# Patient Record
Sex: MALE | Race: WHITE | NOT HISPANIC OR LATINO | Employment: FULL TIME | URBAN - METROPOLITAN AREA
[De-identification: names, ages, dates, MRNs, and addresses within clinical notes are randomized per-mention and may not be internally consistent; named-entity substitution may affect disease eponyms.]

---

## 2020-01-16 ENCOUNTER — HOSPITAL ENCOUNTER (EMERGENCY)
Facility: HOSPITAL | Age: 40
Discharge: HOME | End: 2020-01-17
Attending: EMERGENCY MEDICINE
Payer: COMMERCIAL

## 2020-01-16 DIAGNOSIS — S62.661B OPEN NONDISPLACED FRACTURE OF DISTAL PHALANX OF LEFT INDEX FINGER, INITIAL ENCOUNTER: Primary | ICD-10-CM

## 2020-01-16 DIAGNOSIS — S61.209A FINGERTIP AVULSION, INITIAL ENCOUNTER: ICD-10-CM

## 2020-01-16 PROCEDURE — 99284 EMERGENCY DEPT VISIT MOD MDM: CPT | Mod: 25

## 2020-01-16 PROCEDURE — 12041 INTMD RPR N-HF/GENIT 2.5CM/<: CPT | Mod: F1

## 2020-01-16 PROCEDURE — 25000003 PHARM REV CODE 250: Performed by: EMERGENCY MEDICINE

## 2020-01-16 PROCEDURE — 96372 THER/PROPH/DIAG INJ SC/IM: CPT

## 2020-01-16 PROCEDURE — 29130 APPL FINGER SPLINT STATIC: CPT | Mod: F1

## 2020-01-16 RX ORDER — LIDOCAINE HYDROCHLORIDE 10 MG/ML
10 INJECTION, SOLUTION EPIDURAL; INFILTRATION; INTRACAUDAL; PERINEURAL
Status: COMPLETED | OUTPATIENT
Start: 2020-01-16 | End: 2020-01-16

## 2020-01-16 RX ORDER — OXYCODONE AND ACETAMINOPHEN 5; 325 MG/1; MG/1
2 TABLET ORAL
Status: COMPLETED | OUTPATIENT
Start: 2020-01-16 | End: 2020-01-16

## 2020-01-16 RX ADMIN — LIDOCAINE HYDROCHLORIDE 100 MG: 10 INJECTION, SOLUTION EPIDURAL; INFILTRATION; INTRACAUDAL at 11:01

## 2020-01-16 RX ADMIN — OXYCODONE HYDROCHLORIDE AND ACETAMINOPHEN 2 TABLET: 5; 325 TABLET ORAL at 08:01

## 2020-01-17 VITALS
DIASTOLIC BLOOD PRESSURE: 100 MMHG | RESPIRATION RATE: 18 BRPM | HEART RATE: 80 BPM | TEMPERATURE: 98 F | BODY MASS INDEX: 30.69 KG/M2 | SYSTOLIC BLOOD PRESSURE: 142 MMHG | OXYGEN SATURATION: 100 % | WEIGHT: 184.19 LBS | HEIGHT: 65 IN

## 2020-01-17 PROCEDURE — 25000003 PHARM REV CODE 250: Performed by: EMERGENCY MEDICINE

## 2020-01-17 PROCEDURE — 63600175 PHARM REV CODE 636 W HCPCS: Performed by: EMERGENCY MEDICINE

## 2020-01-17 PROCEDURE — 90715 TDAP VACCINE 7 YRS/> IM: CPT | Performed by: EMERGENCY MEDICINE

## 2020-01-17 PROCEDURE — 90471 IMMUNIZATION ADMIN: CPT | Performed by: EMERGENCY MEDICINE

## 2020-01-17 RX ORDER — MORPHINE SULFATE 4 MG/ML
4 INJECTION, SOLUTION INTRAMUSCULAR; INTRAVENOUS
Status: COMPLETED | OUTPATIENT
Start: 2020-01-17 | End: 2020-01-17

## 2020-01-17 RX ORDER — ONDANSETRON 4 MG/1
4 TABLET, ORALLY DISINTEGRATING ORAL
Status: COMPLETED | OUTPATIENT
Start: 2020-01-17 | End: 2020-01-17

## 2020-01-17 RX ORDER — TRAMADOL HYDROCHLORIDE 50 MG/1
50 TABLET ORAL EVERY 4 HOURS PRN
Qty: 21 TABLET | Refills: 0 | Status: SHIPPED | OUTPATIENT
Start: 2020-01-17

## 2020-01-17 RX ORDER — CEPHALEXIN 500 MG/1
500 CAPSULE ORAL 4 TIMES DAILY
Qty: 28 CAPSULE | Refills: 0 | Status: SHIPPED | OUTPATIENT
Start: 2020-01-17 | End: 2020-01-24

## 2020-01-17 RX ORDER — CEFAZOLIN SODIUM 1 G/3ML
1 INJECTION, POWDER, FOR SOLUTION INTRAMUSCULAR; INTRAVENOUS
Status: COMPLETED | OUTPATIENT
Start: 2020-01-17 | End: 2020-01-17

## 2020-01-17 RX ADMIN — CEFAZOLIN 1 G: 330 INJECTION, POWDER, FOR SOLUTION INTRAMUSCULAR; INTRAVENOUS at 12:01

## 2020-01-17 RX ADMIN — ONDANSETRON 4 MG: 4 TABLET, ORALLY DISINTEGRATING ORAL at 12:01

## 2020-01-17 RX ADMIN — MORPHINE SULFATE 4 MG: 4 INJECTION INTRAVENOUS at 12:01

## 2020-01-17 RX ADMIN — CLOSTRIDIUM TETANI TOXOID ANTIGEN (FORMALDEHYDE INACTIVATED), CORYNEBACTERIUM DIPHTHERIAE TOXOID ANTIGEN (FORMALDEHYDE INACTIVATED), BORDETELLA PERTUSSIS TOXOID ANTIGEN (GLUTARALDEHYDE INACTIVATED), BORDETELLA PERTUSSIS FILAMENTOUS HEMAGGLUTININ ANTIGEN (FORMALDEHYDE INACTIVATED), BORDETELLA PERTUSSIS PERTACTIN ANTIGEN, AND BORDETELLA PERTUSSIS FIMBRIAE 2/3 ANTIGEN 0.5 ML: 5; 2; 2.5; 5; 3; 5 INJECTION, SUSPENSION INTRAMUSCULAR at 01:01

## 2020-01-17 NOTE — ED PROVIDER NOTES
SCRIBE #1 NOTE: I, Briana Ayala, am scribing for, and in the presence of, Carmine Painting MD. I have scribed the entire note.       History     Chief Complaint   Patient presents with    Laceration     pt reports he cut the tip of his second finger on his laeft hand off with a steel saw an hour ago. Bleeding controlled.  tetanus not up to date      Review of patient's allergies indicates:  No Known Allergies      History of Present Illness     HPI    1/16/2020, 8:29 PM  History obtained from the patient      History of Present Illness: Zane Xie is a 39 y.o. male patient who presents to the Emergency Department for evaluation of L index finger injury which onset suddenly PTA while using a skill saw. Symptoms are constant and moderate in severity. No mitigating or exacerbating factors reported. No associated sxs. Patient denies any LOC, fever, chills, extremity weakness/numbness, dizziness, lightheadedness, n/v, and all other sxs at this time. No prior tx. Tetanus is not UTD. No further complaints or concerns at this time.         Arrival mode: Personal vehicle    PCP: Primary Doctor No        Past Medical History:  History reviewed. No pertinent medical history.    Past Surgical History:  History reviewed. No pertinent surgical history.    Family History:  History reviewed. No pertinent family history.    Social History:  Social History     Tobacco Use    Smoking status: Current Every Day Smoker     Packs/day: 1.00   Substance and Sexual Activity    Alcohol use: Yes     Comment: occas    Drug use: unknown    Sexual activity: unknown        Review of Systems     Review of Systems   Constitutional: Negative for activity change, chills, diaphoresis and fever.   HENT: Negative for congestion, rhinorrhea, sneezing, sore throat and trouble swallowing.    Eyes: Negative for pain.   Respiratory: Negative for cough, chest tightness, shortness of breath, wheezing and stridor.    Cardiovascular:  Negative for chest pain, palpitations and leg swelling.   Gastrointestinal: Negative for abdominal distention, abdominal pain, constipation, diarrhea, nausea and vomiting.   Genitourinary: Negative for difficulty urinating, dysuria, frequency and urgency.   Musculoskeletal: Negative for arthralgias, back pain, myalgias, neck pain and neck stiffness.        (+) L index finger injury   Skin: Negative for pallor, rash and wound.   Neurological: Negative for dizziness, syncope, weakness, light-headedness, numbness and headaches.   Hematological: Does not bruise/bleed easily.   All other systems reviewed and are negative.     Physical Exam     Initial Vitals [01/16/20 2007]   BP Pulse Resp Temp SpO2   (!) 165/111 84 16 98.5 °F (36.9 °C) 100 %      MAP       --          Physical Exam  Nursing Notes and Vital Signs Reviewed.  Constitutional: Patient is in mild distress. Well-developed and well-nourished.  Head: Atraumatic. Normocephalic.  Eyes: PERRL. EOM intact. Conjunctivae are not pale. No scleral icterus.  ENT: Mucous membranes are moist. Oropharynx is clear and symmetric.    Neck: Supple. Full ROM. No lymphadenopathy.  Cardiovascular: Regular rate. Regular rhythm. No murmurs, rubs, or gallops. Distal pulses are 2+ and symmetric.  Pulmonary/Chest: No respiratory distress. Clear to auscultation bilaterally. No wheezing or rales.  Abdominal: Soft and non-distended.  There is no tenderness.  No rebound, guarding, or rigidity. Good bowel sounds.  Genitourinary: No CVA tenderness  Musculoskeletal: L hand index finger distal tip amputation including distal third of fingernail including nail matrix with venous oozing.  Skin: Warm and dry.  Neurological:  Alert, awake, and appropriate.  Normal speech.  No acute focal neurological deficits are appreciated.  Psychiatric: Normal affect. Good eye contact. Appropriate in content.     ED Course   Closed treatment of distal phalangeal fracture of left index finger, without  manipulation  Date/Time: 1/16/2020 11:15 PM  Performed by: Carmine Painting MD  Authorized by: Carmine Painting MD     Consent Done?:  Yes  Universal Protocol:     Verbal consent obtained?: Yes      Consent given by:  Patient    Patient identity confirmed:  Name, verbally with patient and provided demographic data  Injury:     Injury location:  Finger    Location details:  Left index finger    Injury type:  Fracture    Fracture type: distal phalanx      MCP joint involved?: No      Any IP joint involved?: No        Pre-procedure assessment:     Neurovascular status: Neurovascularly intact      Distal perfusion: normal      Neurological function: normal      Range of motion: normal      Local anesthesia used?: Yes      Anesthesia:  Digital block    Local anesthetic:  Lidocaine 1% without epinephrine    Patient sedated?: No        Selections made in this section will also lock the Injury type section above.:     Manipulation performed?: No      Immobilization:  Splint    Splint type:  Static finger    Supplies used:  Aluminum splint    Complications: No      Specimens: No      Implants: No    Post-procedure assessment:     Neurovascular status: Neurovascularly intact      Distal perfusion: normal      Neurological function: normal      Range of motion: splinted      Patient tolerance:  Patient tolerated the procedure well with no immediate complications                 Intermediate laceration repair, hand (left index finger), 1.7 cm, multi-layer  Date/Time: 1/16/2020 11:16 PM  Performed by: Carmine Painting MD  Authorized by: Carmine Painting MD   Consent Done: Yes  Consent: Verbal consent obtained.  Risks and benefits: risks, benefits and alternatives were discussed  Consent given by: patient  Patient identity confirmed: name, verbally with patient and provided demographic data  Body area: upper extremity  Location details: left index finger  Laceration length: 1.7 cm  Foreign bodies: no foreign  "bodies  Tendon involvement: none  Nerve involvement: none  Vascular damage: no  Anesthesia: digital block    Anesthesia:  Local anesthesia used: yes  Local Anesthetic: lidocaine 1% without epinephrine  Patient sedated: no  Preparation: Patient was prepped and draped in the usual sterile fashion.  Irrigation solution: saline  Amount of cleaning: extensive  Debridement: extensive  Degree of undermining: minimal  Skin closure: 3-0 Prolene (4)  Subcutaneous closure: 3-0 Vicryl (2)  Number of sutures: 7  Technique: simple (simple interrupted)  Approximation: loose  Approximation difficulty: complex  Dressing: Xeroform gauze, 4x4 sterile gauze, dressing applied and splint for protection  Patient tolerance: Patient tolerated the procedure well with no immediate complications        ED Vital Signs:  Vitals:    01/16/20 2007 01/16/20 2247 01/17/20 0122   BP: (!) 165/111  (!) 142/100   Pulse: 84 80 80   Resp: 16  18   Temp: 98.5 °F (36.9 °C)  98.2 °F (36.8 °C)   TempSrc: Oral  Oral   SpO2: 100%  100%   Weight: 83.6 kg (184 lb 3.1 oz)     Height: 5' 5" (1.651 m)         Abnormal Lab Results:  Labs Reviewed - No data to display     All Lab Results:  none    Imaging Results:  Imaging Results          X-Ray Finger 2 or More Views Left (Final result)  Result time 01/16/20 20:47:12    Final result by KOFI Gonsalez Sr., MD (01/16/20 20:47:12)                 Impression:      1. There has been amputation of the distal aspect of the distal phalanx of the index finger.  2. There is an acute appearing nondisplaced oblique fracture in the lateral aspect of the distal portion of the distal phalanx of the index finger.  3. There has been amputation of the soft tissue from the distal aspect of the index finger.      Electronically signed by: Xander Gonsalez MD  Date:    01/16/2020  Time:    20:47             Narrative:    EXAMINATION:  XR FINGER 2 OR MORE VIEWS LEFT    CLINICAL HISTORY:  finger " injury;    COMPARISON:  12/01/2012    FINDINGS:  There has been amputation of the distal aspect of the distal phalanx of the index finger.  There is an acute appearing nondisplaced oblique fracture in the lateral aspect of the distal portion of the distal phalanx of the index finger. There has been amputation of the soft tissue from the distal aspect of the index finger.  There is no dislocation.                                      The Emergency Provider reviewed the vital signs and test results, which are outlined above.     ED Discussion     8:14 PM: Discussed patient's case with Dr. Thompson, orthopedic surgeon on call, for recommendations. He recommends repairing the distal phalanx fracture-amputation in the ED as above, Rongeur bone if necessary for loose closure, and discharge to follow up with hand surgery in outpatient clinic for further management.      12:05 AM: Discussed with pt all pertinent ED information and results. Discussed pt dx and plan of tx. Gave pt all f/u and return to the ED instructions. All questions and concerns were addressed at this time. Pt expresses understanding of information and instructions, and is comfortable with plan to discharge. Pt is stable for discharge.    I discussed with patient and/or family/caretaker that evaluation in the ED does not suggest any emergent or life threatening medical conditions requiring immediate intervention beyond what was provided in the ED, and I believe patient is safe for discharge.  Regardless, an unremarkable evaluation in the ED does not preclude the development or presence of a serious of life threatening condition. As such, patient was instructed to return immediately for any worsening or change in current symptoms.           Medical Decision Making:   Clinical Tests:   Radiological Study: Ordered and Reviewed           ED Medication(s):  Medications   oxyCODONE-acetaminophen 5-325 mg per tablet 2 tablet (2 tablets Oral Given 1/16/20 2034)    lidocaine (PF) 10 mg/ml (1%) injection 100 mg (100 mg Infiltration Given 1/16/20 2869)   morphine injection 4 mg (4 mg Intramuscular Given 1/17/20 0052)   ondansetron disintegrating tablet 4 mg (4 mg Oral Given 1/17/20 0052)   ceFAZolin injection 1 g (1 g Intramuscular Given 1/17/20 0052)   DIPH,PERTUSS(ACEL),TET VAC(PF)(ADULT)(ADACEL)(TDaP) (0.5 mLs Intramuscular Given 1/17/20 0113)       There are no discharge medications for this patient.      Follow-up Information     Abhay Smith MD. Schedule an appointment as soon as possible for a visit in 3 days.    Specialty:  Orthopedic Surgery  Why:  sutures must come out within 7-10 days  Contact information:  [address]  [place] [address]  [phone]                       Scribe Attestation:   Scribe #1: I performed the above scribed service and the documentation accurately describes the services I performed. I attest to the accuracy of the note.     Attending:   Physician Attestation Statement for Scribe #1: I, Carmine Painting MD, personally performed the services described in this documentation, as scribed by Briana Ayala, in my presence, and it is both accurate and complete.            Clinical Impression       ICD-10-CM ICD-9-CM   1. Open nondisplaced fracture of distal phalanx of left index finger, initial encounter S62.661B 816.12   2. Fingertip avulsion, initial encounter S61.209A 883.0       Disposition:   Disposition: Discharged  Condition: Stable         Carmine Painting MD  01/18/20 2120

## 2020-01-23 ENCOUNTER — TELEPHONE (OUTPATIENT)
Dept: ORTHOPEDICS | Facility: CLINIC | Age: 40
End: 2020-01-23

## 2020-01-24 ENCOUNTER — OFFICE VISIT (OUTPATIENT)
Dept: ORTHOPEDICS | Facility: CLINIC | Age: 40
End: 2020-01-24
Payer: COMMERCIAL

## 2020-01-24 VITALS
HEART RATE: 96 BPM | DIASTOLIC BLOOD PRESSURE: 101 MMHG | SYSTOLIC BLOOD PRESSURE: 140 MMHG | WEIGHT: 184 LBS | HEIGHT: 65 IN | BODY MASS INDEX: 30.66 KG/M2

## 2020-01-24 DIAGNOSIS — S68.111A TRAUMATIC AMPUTATION OF LEFT INDEX FINGER: ICD-10-CM

## 2020-01-24 DIAGNOSIS — S62.661A CLOSED NONDISPLACED FRACTURE OF DISTAL PHALANX OF LEFT INDEX FINGER, INITIAL ENCOUNTER: Primary | ICD-10-CM

## 2020-01-24 PROCEDURE — 99999 PR PBB SHADOW E&M-EST. PATIENT-LVL III: CPT | Mod: PBBFAC,,,

## 2020-01-24 PROCEDURE — 99999 PR PBB SHADOW E&M-EST. PATIENT-LVL III: ICD-10-PCS | Mod: PBBFAC,,,

## 2020-01-24 RX ORDER — DEXTROAMPHETAMINE SACCHARATE, AMPHETAMINE ASPARTATE MONOHYDRATE, DEXTROAMPHETAMINE SULFATE AND AMPHETAMINE SULFATE 7.5; 7.5; 7.5; 7.5 MG/1; MG/1; MG/1; MG/1
CAPSULE, EXTENDED RELEASE ORAL
COMMUNITY
Start: 2020-01-05

## 2020-01-24 RX ORDER — DEXTROAMPHETAMINE SACCHARATE, AMPHETAMINE ASPARTATE, DEXTROAMPHETAMINE SULFATE AND AMPHETAMINE SULFATE 2.5; 2.5; 2.5; 2.5 MG/1; MG/1; MG/1; MG/1
TABLET ORAL
COMMUNITY
Start: 2020-01-05

## 2020-01-24 NOTE — PROGRESS NOTES
Subjective:     Patient ID: Zane Xie is a 39 y.o. male.    Chief Complaint: Pain and Injury of the Left Hand      HPI:   39-year-old male presents as in ED follow-up for a partial distal phalanx amputation of his left 2nd digit 1 week ago.  In the ED he was also diagnosed with a nondisplaced fracture shows distal phalanx.  Amputation was sutured splinted.  He was told to follow up us in 1 week.  He was given 2 weeks of antibiotics.      History reviewed. No pertinent past medical history.  History reviewed. No pertinent surgical history.  Review of patient's allergies indicates:  No Known Allergies   Review of Systems   Constitutional: Negative.    Eyes: Negative.    Respiratory: Negative.    Cardiovascular: Negative.    Musculoskeletal: Positive for joint pain.   Skin: Negative.    Neurological: Negative.    Psychiatric/Behavioral: Negative.           Objective:     Vitals:    01/24/20 1531   BP: (!) 140/101   Pulse: 96      General    Constitutional: He is oriented to person, place, and time. He appears well-developed and well-nourished.   HENT:   Head: Normocephalic and atraumatic.   Eyes: Conjunctivae are normal.   Neck: Normal range of motion.   Cardiovascular: Normal rate.    Pulmonary/Chest: Effort normal.   Abdominal: He exhibits no distension.   Neurological: He is alert and oriented to person, place, and time.   Psychiatric: He has a normal mood and affect. His behavior is normal. Judgment and thought content normal.         Left Hand/Wrist Exam     Inspection   Deformity: Hand -  present    Range of Motion     Finger Flexion   DIP Index: normal     Finger Extension   DIP Index: normal    Comments:  Distal phalanx index finger seen with sutures in place overlying scab.  Tip appears well perfused.          Muscle Strength   Left Upper Extremity  Index Finger: 5/5       Imaging:  X-ray of the left index finger shows a nondisplaced fracture of the tip of the distal phalanx of the 2nd  digit.  -please see radiologist dictation for complete report    Assessment / Plan:     Encounter Diagnoses   Name Primary?    Closed nondisplaced fracture of distal phalanx of left index finger, initial encounter Yes    Traumatic amputation of left index finger      Patient should continue his antibiotics from the emergency department.  He will follow up in 1 week to get his sutures taken out. He should keep his amputated tip covered with a Band-Aid.  He should continue range of motion exercises and desensitization      Follow-up: 1 week no xrays      -Discussed findings with patient  -Treatment options and alternatives were discussed with the patient. Patient expressed understanding. Patient was given the opportunity to ask questions and be an active participant in their medical care. Patient had no further questions or concerns at this time.

## 2020-01-31 ENCOUNTER — OFFICE VISIT (OUTPATIENT)
Dept: ORTHOPEDICS | Facility: CLINIC | Age: 40
End: 2020-01-31
Payer: COMMERCIAL

## 2020-01-31 VITALS
DIASTOLIC BLOOD PRESSURE: 99 MMHG | BODY MASS INDEX: 29.57 KG/M2 | HEIGHT: 66 IN | HEART RATE: 116 BPM | WEIGHT: 184 LBS | SYSTOLIC BLOOD PRESSURE: 144 MMHG

## 2020-01-31 DIAGNOSIS — S62.661D CLOSED NONDISPLACED FRACTURE OF DISTAL PHALANX OF LEFT INDEX FINGER WITH ROUTINE HEALING, SUBSEQUENT ENCOUNTER: Primary | ICD-10-CM

## 2020-01-31 DIAGNOSIS — S68.111A TRAUMATIC AMPUTATION OF LEFT INDEX FINGER: ICD-10-CM

## 2020-01-31 PROCEDURE — 99999 PR PBB SHADOW E&M-EST. PATIENT-LVL III: CPT | Mod: PBBFAC,,,

## 2020-01-31 PROCEDURE — 99999 PR PBB SHADOW E&M-EST. PATIENT-LVL III: ICD-10-PCS | Mod: PBBFAC,,,

## 2020-01-31 NOTE — PROGRESS NOTES
Subjective:     Patient ID: Zane Xie is a 39 y.o. male.    Chief Complaint: Post-op Evaluation of the Left Hand      HPI:   39-year-old male following up for a distal phalanx index finger amputation with nondisplaced fracture.  He has last seen 1 week ago for wound evaluation. He is here today for repeat wound evaluation and suture removal.  He reports continued to attend pain neck pain.  He has been doing his twice daily peroxide and water soaks as instructed by Dr. Merida.      History reviewed. No pertinent past medical history.  History reviewed. No pertinent surgical history.  Review of patient's allergies indicates:  No Known Allergies   Review of Systems   Constitutional: Negative.    Eyes: Negative.    Respiratory: Negative.    Cardiovascular: Negative.    Musculoskeletal: Positive for myalgias.   Skin: Negative.    Neurological: Negative.    Psychiatric/Behavioral: Negative.           Objective:     Vitals:    01/31/20 1512   BP: (!) 144/99   Pulse: (!) 116      General    Constitutional: He is oriented to person, place, and time. He appears well-developed and well-nourished.   HENT:   Head: Normocephalic and atraumatic.   Eyes: Conjunctivae are normal.   Neck: Normal range of motion.   Cardiovascular: Normal rate.    Pulmonary/Chest: Effort normal.   Abdominal: He exhibits no distension.   Neurological: He is alert and oriented to person, place, and time.   Psychiatric: He has a normal mood and affect. His behavior is normal. Judgment and thought content normal.         Left Hand/Wrist Exam     Inspection   Bruising: Hand -  present  Deformity: Hand -  present    Other     Sensory Exam  Median Distribution: normal  Ulnar Distribution: normal  Radial Distribution: normal    Comments:  Tenderness at the site of amputation.  No gross infection seen.            Assessment / Plan:     No diagnosis found.    Sutures to be removed today.  Amputation is without signs of infection.  Patient has  finished his oral antibiotics.  He was instructed to continue his desensitization exercises.  He may put triple antibiotic cream on once daily.  He will continue foot on range of motion exercises well. He was instructed on the signs to look for infection including erythema fevers increased pain drainage. If these are present he should return to clinic for Evaluation.    Follow-up:  In 2 weeks for repeat wound evaluation      -Discussed findings with patient  -Treatment options and alternatives were discussed with the patient. Patient expressed understanding. Patient was given the opportunity to ask questions and be an active participant in their medical care. Patient had no further questions or concerns at this time.

## 2020-02-14 ENCOUNTER — OFFICE VISIT (OUTPATIENT)
Dept: ORTHOPEDICS | Facility: CLINIC | Age: 40
End: 2020-02-14
Payer: COMMERCIAL

## 2020-02-14 VITALS
HEART RATE: 91 BPM | WEIGHT: 184 LBS | BODY MASS INDEX: 29.57 KG/M2 | SYSTOLIC BLOOD PRESSURE: 147 MMHG | DIASTOLIC BLOOD PRESSURE: 111 MMHG | HEIGHT: 66 IN

## 2020-02-14 DIAGNOSIS — S68.111A TRAUMATIC AMPUTATION OF LEFT INDEX FINGER: Primary | ICD-10-CM

## 2020-02-14 PROCEDURE — 99999 PR PBB SHADOW E&M-EST. PATIENT-LVL III: ICD-10-PCS | Mod: PBBFAC,,,

## 2020-02-14 PROCEDURE — 99999 PR PBB SHADOW E&M-EST. PATIENT-LVL III: CPT | Mod: PBBFAC,,,

## 2020-02-14 NOTE — PROGRESS NOTES
Subjective:      Patient ID: Zaen Xie is a 39 y.o. male.    Chief Complaint: Pain of the Left Hand      Zane Xie returns to the clinic today for his 2nd post-operative examination of traumatic amputation of left index distal phalanx. Zane Xie rates pain at a 1/10 on visual analog scale.        Objective:    Ortho/SPM Exam    Left index fingers:  Interval healing with new epidermis.  Superficial scab.  No overt sign of infection.  No exposed bone.  Hypersensitivity.  Brisk cap refill.        Assessment:  Improving       No diagnosis found.      Plan:  Patient will continue daily soap and water washes.  He can use a dry dressing as needed.  He will follow up in 4 weeks.  No restriction from usual work, recreational, or sports activities.       There are no diagnoses linked to this encounter.      Follow-up: Zane Xie to follow up in 4 weeks. If there are any questions prior to this, the patient was instructed to contact the office.

## 2020-03-13 ENCOUNTER — OFFICE VISIT (OUTPATIENT)
Dept: ORTHOPEDICS | Facility: CLINIC | Age: 40
End: 2020-03-13
Payer: COMMERCIAL

## 2020-03-13 VITALS
DIASTOLIC BLOOD PRESSURE: 118 MMHG | HEIGHT: 66 IN | BODY MASS INDEX: 29.57 KG/M2 | HEART RATE: 122 BPM | SYSTOLIC BLOOD PRESSURE: 155 MMHG | WEIGHT: 184 LBS

## 2020-03-13 DIAGNOSIS — S68.111A TRAUMATIC AMPUTATION OF LEFT INDEX FINGER: Primary | ICD-10-CM

## 2020-03-13 PROCEDURE — 99999 PR PBB SHADOW E&M-EST. PATIENT-LVL III: CPT | Mod: PBBFAC,,,

## 2020-03-13 PROCEDURE — 99999 PR PBB SHADOW E&M-EST. PATIENT-LVL III: ICD-10-PCS | Mod: PBBFAC,,,

## 2020-03-13 NOTE — PROGRESS NOTES
Subjective:      Patient ID: Zane Xie is a 39 y.o. male.    Chief Complaint: Pain of the Left Hand      Zane Xie returns to the clinic today for his 2nd post-operative examination of irrigation debridement of left index finger distal tuft fracture 4 weeks ago. Zane Xie rates pain at a 0/10 on visual analog scale.        Objective:    Ortho/SPM Exam  Well-healed wound.  Brisk cap refill digit.  Active flexion extension, mild restriction of flexion at the DIP and PIP joints.  Light touch sensation intact.          Assessment:  Doing well       No diagnosis found.      Plan:  Or return to the office as needed.  Cleared for all usual work, recreational and sporting activities       There are no diagnoses linked to this encounter.      Follow-up: Zane Xie to follow up in as needed weeks. If there are any questions prior to this, the patient was instructed to contact the office.